# Patient Record
Sex: FEMALE | Race: OTHER | HISPANIC OR LATINO | URBAN - METROPOLITAN AREA
[De-identification: names, ages, dates, MRNs, and addresses within clinical notes are randomized per-mention and may not be internally consistent; named-entity substitution may affect disease eponyms.]

---

## 2017-06-18 ENCOUNTER — EMERGENCY (EMERGENCY)
Facility: HOSPITAL | Age: 52
LOS: 1 days | Discharge: HOME | End: 2017-06-18
Attending: EMERGENCY MEDICINE
Payer: SELF-PAY

## 2017-06-18 VITALS
HEIGHT: 62.99 IN | WEIGHT: 132.94 LBS | TEMPERATURE: 98 F | OXYGEN SATURATION: 98 % | DIASTOLIC BLOOD PRESSURE: 60 MMHG | SYSTOLIC BLOOD PRESSURE: 107 MMHG | RESPIRATION RATE: 18 BRPM | HEART RATE: 78 BPM

## 2017-06-18 DIAGNOSIS — S52.611A DISPLACED FRACTURE OF RIGHT ULNA STYLOID PROCESS, INITIAL ENCOUNTER FOR CLOSED FRACTURE: ICD-10-CM
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
## 2017-06-18 DIAGNOSIS — S52.501A UNSPECIFIED FRACTURE OF THE LOWER END OF RIGHT RADIUS, INITIAL ENCOUNTER FOR CLOSED FRACTURE: ICD-10-CM

## 2017-06-18 PROCEDURE — 99284 EMERGENCY DEPT VISIT MOD MDM: CPT | Mod: 25

## 2017-06-18 PROCEDURE — 99053 MED SERV 10PM-8AM 24 HR FAC: CPT

## 2017-06-19 PROCEDURE — 73090 X-RAY EXAM OF FOREARM: CPT

## 2017-06-19 PROCEDURE — 73090 X-RAY EXAM OF FOREARM: CPT | Mod: 26,LT

## 2017-06-19 PROCEDURE — 73130 X-RAY EXAM OF HAND: CPT | Mod: 26,RT

## 2017-06-19 PROCEDURE — 99284 EMERGENCY DEPT VISIT MOD MDM: CPT | Mod: 25

## 2017-06-19 PROCEDURE — 73110 X-RAY EXAM OF WRIST: CPT

## 2017-06-19 PROCEDURE — 73110 X-RAY EXAM OF WRIST: CPT | Mod: 26,RT

## 2017-06-19 PROCEDURE — 73130 X-RAY EXAM OF HAND: CPT

## 2017-06-19 RX ORDER — IBUPROFEN 200 MG
600 TABLET ORAL ONCE
Qty: 0 | Refills: 0 | Status: COMPLETED | OUTPATIENT
Start: 2017-06-19 | End: 2017-06-19

## 2017-06-19 RX ORDER — ACETAMINOPHEN 500 MG
2 TABLET ORAL
Qty: 30 | Refills: 0 | OUTPATIENT
Start: 2017-06-19 | End: 2017-06-24

## 2017-06-19 RX ADMIN — Medication 600 MILLIGRAM(S): at 01:24

## 2017-06-19 RX ADMIN — Medication 600 MILLIGRAM(S): at 01:10

## 2017-06-19 NOTE — ED PROVIDER NOTE - MUSCULOSKELETAL, MLM
Spine appears normal, right hand neurovascularly intact, able to move all digits, good capillary refill, right distal radius TTP, minimal swelling, no proximal forearm TTP or swelling

## 2017-06-19 NOTE — ED PROVIDER NOTE - OBJECTIVE STATEMENT
52 y/o F pt with no significant PMHx presents to ED c/o R distal wrist pain s/p fall x today. Pt reports falling backwards with an outstretched hand behind her and gradually developed right wrist pain. Pt states not taking any medication for the pain. Pt notes her right hand is her dominant hand. Pt denies fever, chills, numbness, tingling, weakness, nausea, vomiting, diarrhea, swelling, lacerations, head trauma, no LOC, or any other complaints. NKDA.

## 2017-06-19 NOTE — ED PROVIDER NOTE - CHPI ED SYMPTOMS NEG
no fever/no weakness/no numbness/no tingling/no chills, no swelling, no lacerations, no nausea, no vomiting, no diarrhea, no LOC

## 2017-06-19 NOTE — ED PROVIDER NOTE - MEDICAL DECISION MAKING DETAILS
52 y/o F pt with right distal pain s/p fall x today. Will do x-ray to r/o fracture, give Motrin, and dc home with PMD f/u and recommend to [facility].